# Patient Record
Sex: MALE | Race: WHITE
[De-identification: names, ages, dates, MRNs, and addresses within clinical notes are randomized per-mention and may not be internally consistent; named-entity substitution may affect disease eponyms.]

---

## 2022-07-16 ENCOUNTER — HOSPITAL ENCOUNTER (INPATIENT)
Dept: HOSPITAL 4 - SED | Age: 57
LOS: 5 days | Discharge: HOME | DRG: 377 | End: 2022-07-21
Attending: FAMILY MEDICINE | Admitting: FAMILY MEDICINE
Payer: COMMERCIAL

## 2022-07-16 VITALS — HEIGHT: 68 IN | BODY MASS INDEX: 33.34 KG/M2 | WEIGHT: 220 LBS

## 2022-07-16 VITALS — SYSTOLIC BLOOD PRESSURE: 101 MMHG

## 2022-07-16 VITALS — SYSTOLIC BLOOD PRESSURE: 122 MMHG

## 2022-07-16 VITALS — SYSTOLIC BLOOD PRESSURE: 123 MMHG

## 2022-07-16 VITALS — SYSTOLIC BLOOD PRESSURE: 133 MMHG

## 2022-07-16 VITALS — SYSTOLIC BLOOD PRESSURE: 87 MMHG

## 2022-07-16 DIAGNOSIS — I12.0: ICD-10-CM

## 2022-07-16 DIAGNOSIS — K57.31: Primary | ICD-10-CM

## 2022-07-16 DIAGNOSIS — K64.8: ICD-10-CM

## 2022-07-16 DIAGNOSIS — E66.9: ICD-10-CM

## 2022-07-16 DIAGNOSIS — E11.65: ICD-10-CM

## 2022-07-16 DIAGNOSIS — D64.9: ICD-10-CM

## 2022-07-16 DIAGNOSIS — Z89.511: ICD-10-CM

## 2022-07-16 DIAGNOSIS — N18.6: ICD-10-CM

## 2022-07-16 DIAGNOSIS — E11.22: ICD-10-CM

## 2022-07-16 DIAGNOSIS — E11.51: ICD-10-CM

## 2022-07-16 DIAGNOSIS — K64.4: ICD-10-CM

## 2022-07-16 DIAGNOSIS — N17.9: ICD-10-CM

## 2022-07-16 DIAGNOSIS — Z20.822: ICD-10-CM

## 2022-07-16 DIAGNOSIS — Z99.2: ICD-10-CM

## 2022-07-16 DIAGNOSIS — R57.8: ICD-10-CM

## 2022-07-16 DIAGNOSIS — E87.5: ICD-10-CM

## 2022-07-16 LAB
ACETONE EXG QL: NEGATIVE
ALBUMIN SERPL BCP-MCNC: 2.4 G/DL (ref 3.4–4.8)
ALT SERPL W P-5'-P-CCNC: 15 U/L (ref 12–78)
ANION GAP SERPL CALCULATED.3IONS-SCNC: 12 MMOL/L (ref 5–15)
ANION GAP SERPL CALCULATED.3IONS-SCNC: 9 MMOL/L (ref 5–15)
AST SERPL W P-5'-P-CCNC: 20 U/L (ref 10–37)
BASOPHILS # BLD AUTO: 0 K/UL (ref 0–0.2)
BASOPHILS NFR BLD AUTO: 0.7 % (ref 0–2)
BILIRUB SERPL-MCNC: 0.4 MG/DL (ref 0–1)
BUN SERPL-MCNC: 159 MG/DL (ref 8–21)
BUN SERPL-MCNC: 77 MG/DL (ref 8–21)
CALCIUM SERPL-MCNC: 9 MG/DL (ref 8.4–11)
CALCIUM SERPL-MCNC: 9.2 MG/DL (ref 8.4–11)
CHLORIDE SERPL-SCNC: 95 MMOL/L (ref 98–107)
CHLORIDE SERPL-SCNC: 98 MMOL/L (ref 98–107)
CREAT SERPL-MCNC: 4.2 MG/DL (ref 0.55–1.3)
CREAT SERPL-MCNC: 7.64 MG/DL (ref 0.55–1.3)
EOSINOPHIL # BLD AUTO: 0.1 K/UL (ref 0–0.4)
EOSINOPHIL NFR BLD AUTO: 1.3 % (ref 0–4)
ERYTHROCYTE [DISTWIDTH] IN BLOOD BY AUTOMATED COUNT: 13.9 % (ref 9–15)
GFR SERPL CREATININE-BSD FRML MDRD: 10 ML/MIN (ref 90–?)
GFR SERPL CREATININE-BSD FRML MDRD: 19 ML/MIN (ref 90–?)
GLUCOSE SERPL-MCNC: 243 MG/DL (ref 70–99)
GLUCOSE SERPL-MCNC: 500 MG/DL (ref 70–99)
HCT VFR BLD AUTO: 20.9 % (ref 36–54)
HGB BLD-MCNC: 6.9 G/DL (ref 14–18)
INR PPP: 1.1 (ref 0.8–1.2)
LIPASE SERPL-CCNC: 141 U/L (ref 73–393)
LYMPHOCYTES # BLD AUTO: 1.5 K/UL (ref 1–5.5)
LYMPHOCYTES NFR BLD AUTO: 21.1 % (ref 20.5–51.5)
MCH RBC QN AUTO: 27 PG (ref 27–31)
MCHC RBC AUTO-ENTMCNC: 33 % (ref 32–36)
MCV RBC AUTO: 82 FL (ref 79–98)
MONOCYTES # BLD MANUAL: 0.4 K/UL (ref 0–1)
MONOCYTES # BLD MANUAL: 6.1 % (ref 1.7–9.3)
NEUTROPHILS # BLD AUTO: 5.1 K/UL (ref 1.8–7.7)
NEUTROPHILS NFR BLD AUTO: 70.8 % (ref 40–70)
PLATELET # BLD AUTO: 167 K/UL (ref 130–430)
POTASSIUM SERPL-SCNC: 3.8 MMOL/L (ref 3.5–5.1)
POTASSIUM SERPL-SCNC: 7.3 MMOL/L (ref 3.5–5.1)
PROTHROMBIN TIME: 10.9 SECS (ref 9.5–12.5)
RBC # BLD AUTO: 2.56 MIL/UL (ref 4.2–6.2)
SODIUM SERPLBLD-SCNC: 133 MMOL/L (ref 136–145)
SODIUM SERPLBLD-SCNC: 139 MMOL/L (ref 136–145)
WBC # BLD AUTO: 7.2 K/UL (ref 4.8–10.8)

## 2022-07-16 PROCEDURE — P9021 RED BLOOD CELLS UNIT: HCPCS

## 2022-07-16 PROCEDURE — 30233N1 TRANSFUSION OF NONAUTOLOGOUS RED BLOOD CELLS INTO PERIPHERAL VEIN, PERCUTANEOUS APPROACH: ICD-10-PCS | Performed by: FAMILY MEDICINE

## 2022-07-16 PROCEDURE — C9113 INJ PANTOPRAZOLE SODIUM, VIA: HCPCS

## 2022-07-16 RX ADMIN — DEXTROSE AND SODIUM CHLORIDE SCH MLS/HR: 5; 450 INJECTION, SOLUTION INTRAVENOUS at 20:41

## 2022-07-16 NOTE — NUR
-------------------------------------------------------------------------------

           *** Note undone in [facility] - 07/16/22 at 1503 by [email or web address] ***            

-------------------------------------------------------------------------------

Report given to Ankit COYLE

## 2022-07-16 NOTE — NUR
Pt brought by ALS, A&Ox4, pt presents to ER with rectal bleeding/ tarry 
stool,, pt states he did not go to dyalisis today since he had diarrhea, 
pt respirations even and unlabored,copious amount of blood noticed in the 
gurney, 1 liter of fluid started by EMS, 20 gauge IV on L hand is intact cap 
refill <3, skin is pale, pt has Hx of R BKA, will cont to monitor.

## 2022-07-16 NOTE — NUR
CONSULTATION PAGED/CALLED

Reason for Consultation: GIB

Person Who was Notified: JANETTE

Consulting Physician: DR. NEELY (DR. SINGH ON CALL)

Consultant Specialty: GI

Ordering Physician: DR. HERRERA





Reason for Consultation: CRITICAL CARE

Person Who was Notified: MD AWARE OF CONSULTS ORDERS ENTERED ON [employer]

Consulting Physician: DR. KELLY

Consultant Specialty: CARDIOPULMONARY

Ordering Physician: DR. HERRERA

## 2022-07-16 NOTE — NUR
Blood consent signed by POA (sister), brought to blood bank.  stated 
approx one hour depending on presence of any antibitoies.

## 2022-07-16 NOTE — NUR
Admit bed requested 



Patient will be admitted to care of Dr. Guillen.  

Admitted to ICU unit.

Diagnosis Hyperkalemia, GI Bleeding

Inpatient (Yes or No)  Y

Observation (Yes or No) N

Orientation concerns or request close to nursing station  (Yes or No) N

Covid Status Pend

On vent or bipap N

Isolation requirements N

Needs a sitter N

From Home (Yes or if No enter name of facility) Home

Requires Dialysis (Yes or No) Y 

Med Rec Completed (Yes of No) Y

## 2022-07-16 NOTE — NUR
Patient noted to have dried blood around rectum and buttocks. Cleansed patient, 
and made sister aware that he would be going to ICU and getting blood shortly.

## 2022-07-17 VITALS — SYSTOLIC BLOOD PRESSURE: 132 MMHG

## 2022-07-17 VITALS — SYSTOLIC BLOOD PRESSURE: 111 MMHG

## 2022-07-17 VITALS — SYSTOLIC BLOOD PRESSURE: 147 MMHG

## 2022-07-17 VITALS — SYSTOLIC BLOOD PRESSURE: 143 MMHG

## 2022-07-17 VITALS — SYSTOLIC BLOOD PRESSURE: 142 MMHG

## 2022-07-17 VITALS — SYSTOLIC BLOOD PRESSURE: 110 MMHG

## 2022-07-17 VITALS — SYSTOLIC BLOOD PRESSURE: 150 MMHG

## 2022-07-17 VITALS — SYSTOLIC BLOOD PRESSURE: 129 MMHG

## 2022-07-17 VITALS — SYSTOLIC BLOOD PRESSURE: 128 MMHG

## 2022-07-17 VITALS — SYSTOLIC BLOOD PRESSURE: 103 MMHG

## 2022-07-17 VITALS — SYSTOLIC BLOOD PRESSURE: 144 MMHG

## 2022-07-17 VITALS — SYSTOLIC BLOOD PRESSURE: 135 MMHG

## 2022-07-17 VITALS — SYSTOLIC BLOOD PRESSURE: 120 MMHG

## 2022-07-17 VITALS — SYSTOLIC BLOOD PRESSURE: 134 MMHG

## 2022-07-17 VITALS — SYSTOLIC BLOOD PRESSURE: 96 MMHG

## 2022-07-17 LAB
ALBUMIN SERPL BCP-MCNC: 2.3 G/DL (ref 3.4–4.8)
ALT SERPL W P-5'-P-CCNC: 16 U/L (ref 12–78)
ANION GAP SERPL CALCULATED.3IONS-SCNC: 13 MMOL/L (ref 5–15)
AST SERPL W P-5'-P-CCNC: 18 U/L (ref 10–37)
BASOPHILS # BLD AUTO: 0.1 K/UL (ref 0–0.2)
BASOPHILS NFR BLD AUTO: 0.9 % (ref 0–2)
BILIRUB SERPL-MCNC: 0.4 MG/DL (ref 0–1)
BUN SERPL-MCNC: 72 MG/DL (ref 8–21)
CALCIUM SERPL-MCNC: 8.5 MG/DL (ref 8.4–11)
CHLORIDE SERPL-SCNC: 97 MMOL/L (ref 98–107)
CREAT SERPL-MCNC: 4.47 MG/DL (ref 0.55–1.3)
EOSINOPHIL # BLD AUTO: 0.1 K/UL (ref 0–0.4)
EOSINOPHIL NFR BLD AUTO: 0.8 % (ref 0–4)
ERYTHROCYTE [DISTWIDTH] IN BLOOD BY AUTOMATED COUNT: 14.7 % (ref 9–15)
GFR SERPL CREATININE-BSD FRML MDRD: 18 ML/MIN (ref 90–?)
GLUCOSE SERPL-MCNC: 358 MG/DL (ref 70–99)
HCT VFR BLD AUTO: 23.3 % (ref 36–54)
HGB BLD-MCNC: 7.8 G/DL (ref 14–18)
LYMPHOCYTES # BLD AUTO: 1.5 K/UL (ref 1–5.5)
LYMPHOCYTES NFR BLD AUTO: 22.2 % (ref 20.5–51.5)
MCH RBC QN AUTO: 28 PG (ref 27–31)
MCHC RBC AUTO-ENTMCNC: 34 % (ref 32–36)
MCV RBC AUTO: 83 FL (ref 79–98)
MONOCYTES # BLD MANUAL: 0.4 K/UL (ref 0–1)
MONOCYTES # BLD MANUAL: 6.3 % (ref 1.7–9.3)
NEUTROPHILS # BLD AUTO: 4.8 K/UL (ref 1.8–7.7)
NEUTROPHILS NFR BLD AUTO: 69.8 % (ref 40–70)
PLATELET # BLD AUTO: 142 K/UL (ref 130–430)
POTASSIUM SERPL-SCNC: 4 MMOL/L (ref 3.5–5.1)
RBC # BLD AUTO: 2.8 MIL/UL (ref 4.2–6.2)
SODIUM SERPLBLD-SCNC: 139 MMOL/L (ref 136–145)
WBC # BLD AUTO: 6.9 K/UL (ref 4.8–10.8)

## 2022-07-17 RX ADMIN — Medication SCH EA: at 13:05

## 2022-07-17 RX ADMIN — SODIUM CHLORIDE SCH MG: 9 INJECTION, SOLUTION INTRAVENOUS at 08:55

## 2022-07-17 RX ADMIN — HUMAN INSULIN PRN UNITS: 100 INJECTION, SOLUTION SUBCUTANEOUS at 13:04

## 2022-07-17 RX ADMIN — Medication SCH EA: at 17:53

## 2022-07-17 RX ADMIN — Medication SCH EA: at 06:57

## 2022-07-17 RX ADMIN — DEXTROSE AND SODIUM CHLORIDE SCH MLS/HR: 5; 450 INJECTION, SOLUTION INTRAVENOUS at 21:22

## 2022-07-17 RX ADMIN — Medication SCH EA: at 23:45

## 2022-07-17 RX ADMIN — HUMAN INSULIN PRN UNITS: 100 INJECTION, SOLUTION SUBCUTANEOUS at 06:58

## 2022-07-17 RX ADMIN — DEXTROSE AND SODIUM CHLORIDE SCH MLS/HR: 5; 450 INJECTION, SOLUTION INTRAVENOUS at 15:30

## 2022-07-17 NOTE — NUR
RECEIVED FROM MORNING NURSE .PT ON ROOM ROOM AIR  SPO2-100%.ALERT AND ORIENTED X 4 .PT ON 
JENAE D5NS@40MLS/HR.

## 2022-07-17 NOTE — NUR
Pt a/ox4, SR on the monitor. IVF at 40ml/hr. Pt NPO status. Obtained informed consent for 
colonscopy tommorrow, discussed risks and benefits of procedure. Also 2 sisters at the 
bedside and aware of procedure. Right arm AV shunt patent with good thrill and bruit. Right 
neck EJ patent w/o signs of infiltration. Pt had BM this AM, skin washed and kept clean and 
dry. All needs anticipated and met. VSS.

## 2022-07-18 VITALS — SYSTOLIC BLOOD PRESSURE: 177 MMHG

## 2022-07-18 VITALS — SYSTOLIC BLOOD PRESSURE: 111 MMHG

## 2022-07-18 VITALS — SYSTOLIC BLOOD PRESSURE: 144 MMHG

## 2022-07-18 VITALS — SYSTOLIC BLOOD PRESSURE: 150 MMHG

## 2022-07-18 VITALS — SYSTOLIC BLOOD PRESSURE: 167 MMHG

## 2022-07-18 VITALS — SYSTOLIC BLOOD PRESSURE: 160 MMHG

## 2022-07-18 VITALS — SYSTOLIC BLOOD PRESSURE: 154 MMHG

## 2022-07-18 VITALS — SYSTOLIC BLOOD PRESSURE: 151 MMHG

## 2022-07-18 VITALS — SYSTOLIC BLOOD PRESSURE: 153 MMHG

## 2022-07-18 VITALS — SYSTOLIC BLOOD PRESSURE: 162 MMHG

## 2022-07-18 VITALS — SYSTOLIC BLOOD PRESSURE: 148 MMHG

## 2022-07-18 VITALS — SYSTOLIC BLOOD PRESSURE: 145 MMHG

## 2022-07-18 VITALS — SYSTOLIC BLOOD PRESSURE: 106 MMHG

## 2022-07-18 VITALS — SYSTOLIC BLOOD PRESSURE: 103 MMHG

## 2022-07-18 VITALS — SYSTOLIC BLOOD PRESSURE: 130 MMHG

## 2022-07-18 VITALS — SYSTOLIC BLOOD PRESSURE: 158 MMHG

## 2022-07-18 VITALS — SYSTOLIC BLOOD PRESSURE: 155 MMHG

## 2022-07-18 VITALS — SYSTOLIC BLOOD PRESSURE: 131 MMHG

## 2022-07-18 VITALS — SYSTOLIC BLOOD PRESSURE: 136 MMHG

## 2022-07-18 LAB
ALBUMIN SERPL BCP-MCNC: 2.4 G/DL (ref 3.4–4.8)
ALT SERPL W P-5'-P-CCNC: 20 U/L (ref 12–78)
ANION GAP SERPL CALCULATED.3IONS-SCNC: 11 MMOL/L (ref 5–15)
AST SERPL W P-5'-P-CCNC: 31 U/L (ref 10–37)
BASOPHILS # BLD AUTO: 0 K/UL (ref 0–0.2)
BASOPHILS NFR BLD AUTO: 0.9 % (ref 0–2)
BILIRUB SERPL-MCNC: 0.4 MG/DL (ref 0–1)
BUN SERPL-MCNC: 75 MG/DL (ref 8–21)
CALCIUM SERPL-MCNC: 9.2 MG/DL (ref 8.4–11)
CHLORIDE SERPL-SCNC: 98 MMOL/L (ref 98–107)
CREAT SERPL-MCNC: 5.93 MG/DL (ref 0.55–1.3)
EOSINOPHIL # BLD AUTO: 0.3 K/UL (ref 0–0.4)
EOSINOPHIL NFR BLD AUTO: 4.9 % (ref 0–4)
ERYTHROCYTE [DISTWIDTH] IN BLOOD BY AUTOMATED COUNT: 14.9 % (ref 9–15)
GFR SERPL CREATININE-BSD FRML MDRD: 13 ML/MIN (ref 90–?)
GLUCOSE SERPL-MCNC: 199 MG/DL (ref 70–99)
HCT VFR BLD AUTO: 22.5 % (ref 36–54)
HGB BLD-MCNC: 7.5 G/DL (ref 14–18)
INR PPP: 1.1 (ref 0.8–1.2)
LYMPHOCYTES # BLD AUTO: 1.6 K/UL (ref 1–5.5)
LYMPHOCYTES NFR BLD AUTO: 28.9 % (ref 20.5–51.5)
MCH RBC QN AUTO: 28 PG (ref 27–31)
MCHC RBC AUTO-ENTMCNC: 34 % (ref 32–36)
MCV RBC AUTO: 83 FL (ref 79–98)
MONOCYTES # BLD MANUAL: 0.4 K/UL (ref 0–1)
MONOCYTES # BLD MANUAL: 7.6 % (ref 1.7–9.3)
NEUTROPHILS # BLD AUTO: 3.2 K/UL (ref 1.8–7.7)
NEUTROPHILS NFR BLD AUTO: 57.7 % (ref 40–70)
PLATELET # BLD AUTO: 158 K/UL (ref 130–430)
POTASSIUM SERPL-SCNC: 3.6 MMOL/L (ref 3.5–5.1)
PROTHROMBIN TIME: 11.5 SECS (ref 9.5–12.5)
RBC # BLD AUTO: 2.7 MIL/UL (ref 4.2–6.2)
SODIUM SERPLBLD-SCNC: 140 MMOL/L (ref 136–145)
TIBC SERPL-MCNC: 126 UG/DL (ref 250–450)
WBC # BLD AUTO: 5.5 K/UL (ref 4.8–10.8)

## 2022-07-18 PROCEDURE — 0DJD8ZZ INSPECTION OF LOWER INTESTINAL TRACT, VIA NATURAL OR ARTIFICIAL OPENING ENDOSCOPIC: ICD-10-PCS | Performed by: INTERNAL MEDICINE

## 2022-07-18 RX ADMIN — INSULIN GLARGINE SCH UNITS: 100 INJECTION, SOLUTION SUBCUTANEOUS at 08:06

## 2022-07-18 RX ADMIN — HUMAN INSULIN PRN UNITS: 100 INJECTION, SOLUTION SUBCUTANEOUS at 18:19

## 2022-07-18 RX ADMIN — Medication SCH EA: at 11:46

## 2022-07-18 RX ADMIN — Medication SCH EA: at 18:11

## 2022-07-18 RX ADMIN — HUMAN INSULIN PRN UNITS: 100 INJECTION, SOLUTION SUBCUTANEOUS at 00:00

## 2022-07-18 RX ADMIN — DEXTROSE AND SODIUM CHLORIDE SCH MLS/HR: 5; 450 INJECTION, SOLUTION INTRAVENOUS at 23:31

## 2022-07-18 RX ADMIN — Medication SCH EA: at 05:49

## 2022-07-18 RX ADMIN — SODIUM CHLORIDE SCH MG: 9 INJECTION, SOLUTION INTRAVENOUS at 08:00

## 2022-07-18 RX ADMIN — HUMAN INSULIN PRN UNITS: 100 INJECTION, SOLUTION SUBCUTANEOUS at 05:53

## 2022-07-18 RX ADMIN — HUMAN INSULIN PRN UNITS: 100 INJECTION, SOLUTION SUBCUTANEOUS at 11:48

## 2022-07-18 NOTE — NUR
Dr. Dickinson at bedside, no new orders noted at the moment. Will reinforce if needed throughout 
the shift.

## 2022-07-18 NOTE — NUR
Dr. Mosley at bedside, MD is aware of pending colonoscopy procedure. No additional orders 
noted at the moment, will reinforce if needed throughout the shift.

## 2022-07-18 NOTE — NUR
Colonscopy procedure ended, per Dr. Pena no signs of bleeding and colonoscopy is negative, 
OK to resume diet and will inform primary MD. Dr. Lee at bedside, OK to downgrade 
patient, charge RN (Christen) is aware. Will wait for orders to be placed.

## 2022-07-18 NOTE — NUR
GI team at bedside, colonoscopy procedure in place. Time out performed, Dr. Pena is at 
bedside and informed patient about risks and benefits, no questions noted at the moment, and 
will reinforce if needed throughout the shift.

## 2022-07-18 NOTE — NUR
Dr. Lee at bedside, MD is aware of patient pending colonoscopy today (no time noted at 
the moment), patient had no further questions noted at the end of MD's assessment. No new 
orders noted at the moment and will reinforce if needed throughout the shift.

## 2022-07-18 NOTE — NUR
Patient's sister at bedside, provided nursing updates per patient's request, no additional 
questions noted at the moment, will reinforce if needed throughout the shift.

## 2022-07-18 NOTE — NUR
Dr. Franks at bedside, will place dialysis order for tomorrow (07/19/22), will reinforce if 
needed throughout the shift.

## 2022-07-18 NOTE — NUR
morning cares done pt had a total of 4 BMs loose stools. cleaned and linen changed .awaits 
colonoscopy today

## 2022-07-18 NOTE — NUR
RECEIVED PT RESTING IN BED.FULLY ALERT AND ORIENTED.IVF D5NS @ 40CC/H IN PROGRESS.DENIES 
PAIN.CARES OFFERED .PT TURNING SELF

## 2022-07-19 VITALS — SYSTOLIC BLOOD PRESSURE: 164 MMHG

## 2022-07-19 VITALS — SYSTOLIC BLOOD PRESSURE: 148 MMHG

## 2022-07-19 VITALS — SYSTOLIC BLOOD PRESSURE: 166 MMHG

## 2022-07-19 VITALS — SYSTOLIC BLOOD PRESSURE: 161 MMHG

## 2022-07-19 VITALS — SYSTOLIC BLOOD PRESSURE: 160 MMHG

## 2022-07-19 VITALS — SYSTOLIC BLOOD PRESSURE: 187 MMHG

## 2022-07-19 LAB
ALBUMIN SERPL BCP-MCNC: 2.8 G/DL (ref 3.4–4.8)
ALT SERPL W P-5'-P-CCNC: 9 U/L (ref 12–78)
ANION GAP SERPL CALCULATED.3IONS-SCNC: 11 MMOL/L (ref 5–15)
AST SERPL W P-5'-P-CCNC: 15 U/L (ref 10–37)
BASOPHILS # BLD AUTO: 0.1 K/UL (ref 0–0.2)
BASOPHILS NFR BLD AUTO: 0.7 % (ref 0–2)
BILIRUB SERPL-MCNC: 0.2 MG/DL (ref 0–1)
BUN SERPL-MCNC: 44 MG/DL (ref 8–21)
CALCIUM SERPL-MCNC: 7.7 MG/DL (ref 8.4–11)
CHLORIDE SERPL-SCNC: 112 MMOL/L (ref 98–107)
CREAT SERPL-MCNC: 4.2 MG/DL (ref 0.55–1.3)
EOSINOPHIL # BLD AUTO: 0 K/UL (ref 0–0.4)
EOSINOPHIL NFR BLD AUTO: 0.1 % (ref 0–4)
ERYTHROCYTE [DISTWIDTH] IN BLOOD BY AUTOMATED COUNT: 13.9 % (ref 9–15)
GFR SERPL CREATININE-BSD FRML MDRD: 19 ML/MIN (ref 90–?)
GLUCOSE SERPL-MCNC: 107 MG/DL (ref 70–99)
HCT VFR BLD AUTO: 26.9 % (ref 36–54)
HGB BLD-MCNC: 9.3 G/DL (ref 14–18)
LYMPHOCYTES # BLD AUTO: 1 K/UL (ref 1–5.5)
LYMPHOCYTES NFR BLD AUTO: 13 % (ref 20.5–51.5)
MCH RBC QN AUTO: 31 PG (ref 27–31)
MCHC RBC AUTO-ENTMCNC: 35 % (ref 32–36)
MCV RBC AUTO: 88 FL (ref 79–98)
MONOCYTES # BLD MANUAL: 0.5 K/UL (ref 0–1)
MONOCYTES # BLD MANUAL: 6.6 % (ref 1.7–9.3)
NEUTROPHILS # BLD AUTO: 5.9 K/UL (ref 1.8–7.7)
NEUTROPHILS NFR BLD AUTO: 79.6 % (ref 40–70)
PLATELET # BLD AUTO: 171 K/UL (ref 130–430)
POTASSIUM SERPL-SCNC: 4.9 MMOL/L (ref 3.5–5.1)
RBC # BLD AUTO: 3.04 MIL/UL (ref 4.2–6.2)
SODIUM SERPLBLD-SCNC: 140 MMOL/L (ref 136–145)
WBC # BLD AUTO: 7.4 K/UL (ref 4.8–10.8)

## 2022-07-19 PROCEDURE — 5A1D70Z PERFORMANCE OF URINARY FILTRATION, INTERMITTENT, LESS THAN 6 HOURS PER DAY: ICD-10-PCS | Performed by: FAMILY MEDICINE

## 2022-07-19 RX ADMIN — HUMAN INSULIN PRN UNITS: 100 INJECTION, SOLUTION SUBCUTANEOUS at 12:08

## 2022-07-19 RX ADMIN — SODIUM CHLORIDE SCH MG: 9 INJECTION, SOLUTION INTRAVENOUS at 09:49

## 2022-07-19 RX ADMIN — Medication SCH EA: at 12:08

## 2022-07-19 RX ADMIN — Medication SCH EA: at 05:45

## 2022-07-19 RX ADMIN — Medication SCH EA: at 00:07

## 2022-07-19 RX ADMIN — HUMAN INSULIN PRN UNITS: 100 INJECTION, SOLUTION SUBCUTANEOUS at 00:09

## 2022-07-19 RX ADMIN — HUMAN INSULIN PRN UNITS: 100 INJECTION, SOLUTION SUBCUTANEOUS at 17:03

## 2022-07-19 RX ADMIN — Medication SCH EA: at 16:33

## 2022-07-19 RX ADMIN — INSULIN GLARGINE SCH UNITS: 100 INJECTION, SOLUTION SUBCUTANEOUS at 09:51

## 2022-07-19 NOTE — NUR
BLOOD SUGAR COVERED COVERED WITH REGULAR INSULIN 4 UNITS.AWAITS TRANSFER TO MED SURG RM 
107-A. pt updated about downgrading to M/Surg

## 2022-07-19 NOTE — NUR
RECEIVED PT FROM ICU, NO DISTRESS NOTED, DENIES PAIN.  AAOX4.  IV TO LT FA SITE CDI, AV 
FISTULA TO RT FA +BRUIT AND +THRILL.  RT BKA.  PT STATES HE'S ANURIC.  NO S/S OF BLEEDING, 
WILL MONITOR CLOSELY.

## 2022-07-19 NOTE — NUR
CLOSING NOTE

Pt sitting up in bed visiting with his wife. No s/s resp distress, no c/o pain or 
discomfort. All precautions remain in place. Needs met, call light within reach.

## 2022-07-19 NOTE — NUR
INITIAL ROUNDS

Received pt AAOx4, no s/s resp distress, no c/o pain or discomfort. Plan of care for the day 
reviewed with pt-pt verbalized his understanding. Noted pt with elevated BP-will inform 
doctor. Pt to have dialysis today. Pain management, disease process, skin and safety 
discussed-teach back done. Call light within reach.

## 2022-07-19 NOTE — NUR
ELEVATED BP/MD Dr. Dickinson here and informed that the pt's BP was 187/95 with no BP medications-Dr. Dickinson 
stated he will look into it.

## 2022-07-20 VITALS — SYSTOLIC BLOOD PRESSURE: 167 MMHG

## 2022-07-20 VITALS — SYSTOLIC BLOOD PRESSURE: 179 MMHG

## 2022-07-20 VITALS — SYSTOLIC BLOOD PRESSURE: 160 MMHG

## 2022-07-20 VITALS — SYSTOLIC BLOOD PRESSURE: 157 MMHG

## 2022-07-20 VITALS — SYSTOLIC BLOOD PRESSURE: 162 MMHG

## 2022-07-20 VITALS — SYSTOLIC BLOOD PRESSURE: 170 MMHG

## 2022-07-20 LAB
ALBUMIN SERPL BCP-MCNC: 2.4 G/DL (ref 3.4–4.8)
ALT SERPL W P-5'-P-CCNC: 17 U/L (ref 12–78)
ANION GAP SERPL CALCULATED.3IONS-SCNC: 8 MMOL/L (ref 5–15)
AST SERPL W P-5'-P-CCNC: 32 U/L (ref 10–37)
BASOPHILS # BLD AUTO: 0 K/UL (ref 0–0.2)
BASOPHILS NFR BLD AUTO: 0.6 % (ref 0–2)
BILIRUB SERPL-MCNC: 0.4 MG/DL (ref 0–1)
BUN SERPL-MCNC: 37 MG/DL (ref 8–21)
CALCIUM SERPL-MCNC: 8.5 MG/DL (ref 8.4–11)
CHLORIDE SERPL-SCNC: 97 MMOL/L (ref 98–107)
CREAT SERPL-MCNC: 5.2 MG/DL (ref 0.55–1.3)
EOSINOPHIL # BLD AUTO: 0.3 K/UL (ref 0–0.4)
EOSINOPHIL NFR BLD AUTO: 5.8 % (ref 0–4)
ERYTHROCYTE [DISTWIDTH] IN BLOOD BY AUTOMATED COUNT: 15 % (ref 9–15)
GFR SERPL CREATININE-BSD FRML MDRD: 15 ML/MIN (ref 90–?)
GLUCOSE SERPL-MCNC: 113 MG/DL (ref 70–99)
HCT VFR BLD AUTO: 22.2 % (ref 36–54)
HGB BLD-MCNC: 7.5 G/DL (ref 14–18)
LYMPHOCYTES # BLD AUTO: 1.5 K/UL (ref 1–5.5)
LYMPHOCYTES NFR BLD AUTO: 26.6 % (ref 20.5–51.5)
MCH RBC QN AUTO: 28 PG (ref 27–31)
MCHC RBC AUTO-ENTMCNC: 34 % (ref 32–36)
MCV RBC AUTO: 83 FL (ref 79–98)
MONOCYTES # BLD MANUAL: 0.5 K/UL (ref 0–1)
MONOCYTES # BLD MANUAL: 8.3 % (ref 1.7–9.3)
NEUTROPHILS # BLD AUTO: 3.4 K/UL (ref 1.8–7.7)
NEUTROPHILS NFR BLD AUTO: 58.7 % (ref 40–70)
PLATELET # BLD AUTO: 178 K/UL (ref 130–430)
POTASSIUM SERPL-SCNC: 3.5 MMOL/L (ref 3.5–5.1)
RBC # BLD AUTO: 2.68 MIL/UL (ref 4.2–6.2)
SODIUM SERPLBLD-SCNC: 138 MMOL/L (ref 136–145)
WBC # BLD AUTO: 5.8 K/UL (ref 4.8–10.8)

## 2022-07-20 RX ADMIN — Medication SCH EA: at 00:38

## 2022-07-20 RX ADMIN — Medication SCH EA: at 10:51

## 2022-07-20 RX ADMIN — DEXTROSE AND SODIUM CHLORIDE SCH MLS/HR: 5; 450 INJECTION, SOLUTION INTRAVENOUS at 20:43

## 2022-07-20 RX ADMIN — SODIUM CHLORIDE SCH MG: 9 INJECTION, SOLUTION INTRAVENOUS at 10:50

## 2022-07-20 RX ADMIN — CALCIUM ACETATE SCH MG: 667 CAPSULE ORAL at 12:55

## 2022-07-20 RX ADMIN — HUMAN INSULIN PRN UNITS: 100 INJECTION, SOLUTION SUBCUTANEOUS at 12:52

## 2022-07-20 RX ADMIN — INSULIN GLARGINE SCH UNITS: 100 INJECTION, SOLUTION SUBCUTANEOUS at 10:57

## 2022-07-20 RX ADMIN — Medication SCH TAB: at 09:00

## 2022-07-20 RX ADMIN — Medication SCH EA: at 06:10

## 2022-07-20 RX ADMIN — HUMAN INSULIN PRN UNITS: 100 INJECTION, SOLUTION SUBCUTANEOUS at 23:54

## 2022-07-21 VITALS — SYSTOLIC BLOOD PRESSURE: 173 MMHG

## 2022-07-21 VITALS — SYSTOLIC BLOOD PRESSURE: 144 MMHG

## 2022-07-21 VITALS — SYSTOLIC BLOOD PRESSURE: 159 MMHG

## 2022-07-21 VITALS — SYSTOLIC BLOOD PRESSURE: 152 MMHG

## 2022-07-21 LAB
ALBUMIN SERPL BCP-MCNC: 2.4 G/DL (ref 3.4–4.8)
ALT SERPL W P-5'-P-CCNC: 17 U/L (ref 12–78)
ANION GAP SERPL CALCULATED.3IONS-SCNC: 10 MMOL/L (ref 5–15)
AST SERPL W P-5'-P-CCNC: 34 U/L (ref 10–37)
BASOPHILS # BLD AUTO: 0.1 K/UL (ref 0–0.2)
BASOPHILS NFR BLD AUTO: 0.9 % (ref 0–2)
BILIRUB SERPL-MCNC: 0.3 MG/DL (ref 0–1)
BUN SERPL-MCNC: 44 MG/DL (ref 8–21)
CALCIUM SERPL-MCNC: 8.5 MG/DL (ref 8.4–11)
CHLORIDE SERPL-SCNC: 95 MMOL/L (ref 98–107)
CREAT SERPL-MCNC: 6.68 MG/DL (ref 0.55–1.3)
EOSINOPHIL # BLD AUTO: 0.4 K/UL (ref 0–0.4)
EOSINOPHIL NFR BLD AUTO: 7.5 % (ref 0–4)
ERYTHROCYTE [DISTWIDTH] IN BLOOD BY AUTOMATED COUNT: 15.6 % (ref 9–15)
GFR SERPL CREATININE-BSD FRML MDRD: 11 ML/MIN (ref 90–?)
GLUCOSE SERPL-MCNC: 107 MG/DL (ref 70–99)
HCT VFR BLD AUTO: 22.9 % (ref 36–54)
HGB BLD-MCNC: 7.8 G/DL (ref 14–18)
LYMPHOCYTES # BLD AUTO: 1.5 K/UL (ref 1–5.5)
LYMPHOCYTES NFR BLD AUTO: 24.9 % (ref 20.5–51.5)
MCH RBC QN AUTO: 28 PG (ref 27–31)
MCHC RBC AUTO-ENTMCNC: 34 % (ref 32–36)
MCV RBC AUTO: 83 FL (ref 79–98)
MONOCYTES # BLD MANUAL: 0.5 K/UL (ref 0–1)
MONOCYTES # BLD MANUAL: 8.4 % (ref 1.7–9.3)
NEUTROPHILS # BLD AUTO: 3.4 K/UL (ref 1.8–7.7)
NEUTROPHILS NFR BLD AUTO: 58.3 % (ref 40–70)
PLATELET # BLD AUTO: 173 K/UL (ref 130–430)
POTASSIUM SERPL-SCNC: 3.8 MMOL/L (ref 3.5–5.1)
RBC # BLD AUTO: 2.76 MIL/UL (ref 4.2–6.2)
SODIUM SERPLBLD-SCNC: 136 MMOL/L (ref 136–145)
WBC # BLD AUTO: 5.8 K/UL (ref 4.8–10.8)

## 2022-07-21 PROCEDURE — 5A1D70Z PERFORMANCE OF URINARY FILTRATION, INTERMITTENT, LESS THAN 6 HOURS PER DAY: ICD-10-PCS | Performed by: FAMILY MEDICINE

## 2022-07-21 RX ADMIN — Medication SCH EA: at 05:47

## 2022-07-21 RX ADMIN — Medication SCH TAB: at 09:05

## 2022-07-21 RX ADMIN — Medication SCH EA: at 00:18

## 2022-07-21 RX ADMIN — Medication SCH EA: at 18:00

## 2022-07-21 RX ADMIN — SODIUM CHLORIDE SCH MG: 9 INJECTION, SOLUTION INTRAVENOUS at 09:06

## 2022-07-21 RX ADMIN — Medication SCH EA: at 11:35

## 2022-07-21 RX ADMIN — HUMAN INSULIN PRN UNITS: 100 INJECTION, SOLUTION SUBCUTANEOUS at 11:40

## 2022-07-21 RX ADMIN — Medication SCH EA: at 00:00

## 2022-07-21 RX ADMIN — CALCIUM ACETATE SCH MG: 667 CAPSULE ORAL at 18:36

## 2022-07-21 RX ADMIN — CALCIUM ACETATE SCH MG: 667 CAPSULE ORAL at 12:19

## 2022-07-21 RX ADMIN — DEXTROSE AND SODIUM CHLORIDE SCH MLS/HR: 5; 450 INJECTION, SOLUTION INTRAVENOUS at 15:30

## 2022-07-21 RX ADMIN — CALCIUM ACETATE SCH MG: 667 CAPSULE ORAL at 09:05

## 2022-07-21 RX ADMIN — INSULIN GLARGINE SCH UNITS: 100 INJECTION, SOLUTION SUBCUTANEOUS at 09:09

## 2022-07-21 NOTE — NUR
Wound Evaluation:



Wound Consult ordered for Low Chance Score.



Patient evaluated for a low Chance score, now a 20. Patient was awake, alert, oriented and 
received in a Dalton Bed with an Atmos-Air 9000 mattress. Patient is able to turn in bed. 
Skin is intact. 



Recommend: Encourage and assist patient as needed with repositioning every 2 hours with 
pillow support. Elevate, off-load and float bilateral heels with pillows. Offload pressure 
areas with pillows for pressure re-distribution. Perform skin care and monitor skin 
integrity Q shift.

## 2022-07-21 NOTE — NUR
D/C Patient

Patient given medication reconciliation form and D/C instructions. Exit Care provided. 
Patient verbalized understanding. MD discussed with patient the results and treatment 
provided. Non- Ambulatory with MARTHA GARCIA for discharge to home. Patient in stable condition, ID 
band removed. IV catheter removed, intact and dressing applied, no active bleeding. eRx 
given by MD. MD instructed pt to check with his pharmacy for new meds.  Patient educated on 
pain management. All belongings sent with patient. PT dc home with taxi voucher.

## 2022-07-21 NOTE — NUR
Dietitian Recommendations



* Renal, CCHO diet

FRITZ, RD



Please refer to Nutrition Assessment for details.

-------------------------------------------------------------------------------

Addendum: 07/21/22 at 1553 by Neda Coker RD

-------------------------------------------------------------------------------

Amended: Links added.

## 2022-07-21 NOTE — NUR
***** PHYSICAL THERAPY CO-SIGN *****

The Physical Therapy Progress Notes documented by Physical Therapy Assistant have been 
reviewed.



Reviewed/Co-Signed by:  Julio César Beckman

Documentation Done by:  ROB YOUNG PTA

-------------------------------------------------------------------------------

Addendum: 07/21/22 at 0709 by Julio César Beckman PT

-------------------------------------------------------------------------------

Amended: Links added.

## 2022-07-21 NOTE — NUR
PATIENT ASLEEP ALL NIGHT. NO COMPLAINTS MADE. STABLE. NO DISTRESS. KEPT WARM AND 
COMFORTABLE. PIV INTACT AND FLUSHING WELL. NO S/S OF INFILTRATION NOTED. IVF INFUSING WELL 
AT ORDERED RATE. KEPT WARM AND COMFORTABLE. ALL NEEDS ATTENDED. FSBS DONE AND COVERED WITH 
INSULIN ACCORDINGLY. CALLED LIGHT PLACED WITHIN REACH. MONITORED CLOSELY.